# Patient Record
(demographics unavailable — no encounter records)

---

## 2024-11-05 NOTE — REVIEW OF SYSTEMS
[Fatigue] : fatigue [Lower Ext Edema] : lower extremity edema [Diarrhea: Grade 0] : Diarrhea: Grade 0 [Easy Bleeding] : a tendency for easy bleeding [Easy Bruising] : a tendency for easy bruising [Negative] : Musculoskeletal

## 2024-11-07 NOTE — REASON FOR VISIT
[Initial Consultation] : an initial consultation for [Follow-Up Visit] : a follow-up visit for [FreeTextEntry2] : right lower ext DVT and PE

## 2024-11-07 NOTE — ASSESSMENT
[Reviewed updated] : Reviewed updated [Designated Health Care Proxy] : Designated Health Care Proxy [Name: ___] : Name: [unfilled] [Relationship: ___] : Relationship: [unfilled] [DNR] : DNR [Last Verification Date: ___] : Last Verification Date: [unfilled] [FreeTextEntry1] : Right LE DVT/ Saddle PE Provoked by right hip surgery 6/26/24 H/o Left LE DVT after left hip surgery in 2018 s/p eliquis x 6 m Doppler/CT at [facility] 6/26/24: saddle PE with clot extension in all the 5 lung lobes This was done same day as his surgery He was immobile prior to the surgery No Family ho DVT/PE No Family HO miscarriages - mother had 1 miscarriage-unsure which trimester Family Hx of cancer: Father: "spleen cancer" Cancer screening: Colonoscopy was 4 years. PSA annually with PCP (most recently was 13 on 9/5/24, as against 17 few months ago. At one time, PSA was in 40s)  Smoking: Quit 48 years. 1/2 ppd x 15 years Liver transplant 1996 for acute idiopathic fulminant liver failure- prednsione, prograf, cellcept Current Anticoagulation/ Antiplatelet therapy: Xarelto 20 mg QD s/p IVC filter. Advised to remove it within 6 months unless he requires further surgery  Seen today for follow up Factor V Leiden mutation, Prothrombin Gene mutation, APS panel, Protein S, protein C, AT3- negative Has elevated Factor VIII and positive DRVVT (negative DEANGELO, B2G Abs) Explained that given that he has 2 likely provoked VTE events, including the most recent DVT+PE where he had saddle PE, I recommend long term anticoagulation  May consider changing to a prophylactic dose after 12 months if doing well Also advised to be uptodate with age appropriate cancer screening He has consistently elevated albeit declinig PSA. Advised to follow up with urology Fall precautions reiterated  Vitamin B12 def Discussed about SubQ vs Sublingual dosing.  He prefers sublingual dosing Take vitamin B12 5120-8714 mcg sublingual daily forever  IgG Kappa Monoclonal gammopathy M SPike unable to quantitate FLCR nromal Likely MGUS Can be monitored annually  Patient and his wife had multiple questions which were answered to satisfaction  They prefer to monitor his labs (B12, D Dimer, SPEP, IFx, FLCR) with the PCP They will follow up with me PRN [AdvancecareDate] : 11/05/24

## 2024-11-07 NOTE — HISTORY OF PRESENT ILLNESS
[de-identified] : Mr. Narinder Calloway is 77 y/o male with R lower ext DVT and saddle PE here for consultation, referred by Dr. Ed Reynoso Accompanied by his spouse Yuly Calloway  Patient with hx of liver transplant recipient, HLD, lumbar radiculopathy, CAD s/p 4 stents, hernia surgery who had multiple VTEs after hip surgeries. 1st - left hip surgeyr in 2018 - 2 weeks later with left lower leg DVT on popliteal-femoral - on Eliquis x 6 months 2nd - Right hip surgery on 6/26/2024 and right lower leg DVT and saddle PE following immediately after surgery - now on Xarelto 20 mg. He's less mobile due to pain prior to surgery.  FHx: Father with spleen cancer at age 60s Mother with possibly 1 miscarriage  SocialHx: Smoked 1/2 over 30 yrs ago Social Alcohol use Denies any illicit drugs Lives with his spouse Yuly Calloway (HCP)  Screenings: Colonoscopy - 2020   b/l lower ext edema but no chest pain or SOB.  Still on PT for hip surgery   [de-identified] : Pt here for follow up Overall feels well Taking ASA 81mg and xarelto as prescribed

## 2024-11-07 NOTE — PHYSICAL EXAM
[Ambulatory and capable of all self care but unable to carry out any work activities] : Status 2- Ambulatory and capable of all self care but unable to carry out any work activities. Up and about more than 50% of waking hours [Normal] : affect appropriate [de-identified] : swelling on b/l lower ext

## 2024-11-07 NOTE — ASSESSMENT
[Reviewed updated] : Reviewed updated [Designated Health Care Proxy] : Designated Health Care Proxy [Name: ___] : Name: [unfilled] [Relationship: ___] : Relationship: [unfilled] [DNR] : DNR [Last Verification Date: ___] : Last Verification Date: [unfilled] [FreeTextEntry1] : Right LE DVT/ Saddle PE Provoked by right hip surgery 6/26/24 H/o Left LE DVT after left hip surgery in 2018 s/p eliquis x 6 m Doppler/CT at [facility] 6/26/24: saddle PE with clot extension in all the 5 lung lobes This was done same day as his surgery He was immobile prior to the surgery No Family ho DVT/PE No Family HO miscarriages - mother had 1 miscarriage-unsure which trimester Family Hx of cancer: Father: "spleen cancer" Cancer screening: Colonoscopy was 4 years. PSA annually with PCP (most recently was 13 on 9/5/24, as against 17 few months ago. At one time, PSA was in 40s)  Smoking: Quit 48 years. 1/2 ppd x 15 years Liver transplant 1996 for acute idiopathic fulminant liver failure- prednsione, prograf, cellcept Current Anticoagulation/ Antiplatelet therapy: Xarelto 20 mg QD s/p IVC filter. Advised to remove it within 6 months unless he requires further surgery  Seen today for follow up Factor V Leiden mutation, Prothrombin Gene mutation, APS panel, Protein S, protein C, AT3- negative Has elevated Factor VIII and positive DRVVT (negative DEANGELO, B2G Abs) Explained that given that he has 2 likely provoked VTE events, including the most recent DVT+PE where he had saddle PE, I recommend long term anticoagulation  May consider changing to a prophylactic dose after 12 months if doing well Also advised to be uptodate with age appropriate cancer screening He has consistently elevated albeit declinig PSA. Advised to follow up with urology Fall precautions reiterated  Vitamin B12 def Discussed about SubQ vs Sublingual dosing.  He prefers sublingual dosing Take vitamin B12 5298-1842 mcg sublingual daily forever  IgG Kappa Monoclonal gammopathy M SPike unable to quantitate FLCR nromal Likely MGUS Can be monitored annually  Patient and his wife had multiple questions which were answered to satisfaction  They prefer to monitor his labs (B12, D Dimer, SPEP, IFx, FLCR) with the PCP They will follow up with me PRN [AdvancecareDate] : 11/05/24

## 2024-11-07 NOTE — HISTORY OF PRESENT ILLNESS
[de-identified] : Mr. Narinder Calloway is 77 y/o male with R lower ext DVT and saddle PE here for consultation, referred by Dr. Ed Reynoso Accompanied by his spouse Yuly Calloway  Patient with hx of liver transplant recipient, HLD, lumbar radiculopathy, CAD s/p 4 stents, hernia surgery who had multiple VTEs after hip surgeries. 1st - left hip surgeyr in 2018 - 2 weeks later with left lower leg DVT on popliteal-femoral - on Eliquis x 6 months 2nd - Right hip surgery on 6/26/2024 and right lower leg DVT and saddle PE following immediately after surgery - now on Xarelto 20 mg. He's less mobile due to pain prior to surgery.  FHx: Father with spleen cancer at age 60s Mother with possibly 1 miscarriage  SocialHx: Smoked 1/2 over 30 yrs ago Social Alcohol use Denies any illicit drugs Lives with his spouse Yuly Calloway (HCP)  Screenings: Colonoscopy - 2020   b/l lower ext edema but no chest pain or SOB.  Still on PT for hip surgery   [de-identified] : Pt here for follow up Overall feels well Taking ASA 81mg and xarelto as prescribed

## 2024-11-07 NOTE — PHYSICAL EXAM
[Ambulatory and capable of all self care but unable to carry out any work activities] : Status 2- Ambulatory and capable of all self care but unable to carry out any work activities. Up and about more than 50% of waking hours [Normal] : affect appropriate [de-identified] : swelling on b/l lower ext

## 2024-11-07 NOTE — PHYSICAL EXAM
[Ambulatory and capable of all self care but unable to carry out any work activities] : Status 2- Ambulatory and capable of all self care but unable to carry out any work activities. Up and about more than 50% of waking hours [Normal] : affect appropriate [de-identified] : swelling on b/l lower ext

## 2024-11-07 NOTE — ASSESSMENT
[Reviewed updated] : Reviewed updated [Designated Health Care Proxy] : Designated Health Care Proxy [Name: ___] : Name: [unfilled] [Relationship: ___] : Relationship: [unfilled] [DNR] : DNR [Last Verification Date: ___] : Last Verification Date: [unfilled] [FreeTextEntry1] : Right LE DVT/ Saddle PE Provoked by right hip surgery 6/26/24 H/o Left LE DVT after left hip surgery in 2018 s/p eliquis x 6 m Doppler/CT at [facility] 6/26/24: saddle PE with clot extension in all the 5 lung lobes This was done same day as his surgery He was immobile prior to the surgery No Family ho DVT/PE No Family HO miscarriages - mother had 1 miscarriage-unsure which trimester Family Hx of cancer: Father: "spleen cancer" Cancer screening: Colonoscopy was 4 years. PSA annually with PCP (most recently was 13 on 9/5/24, as against 17 few months ago. At one time, PSA was in 40s)  Smoking: Quit 48 years. 1/2 ppd x 15 years Liver transplant 1996 for acute idiopathic fulminant liver failure- prednsione, prograf, cellcept Current Anticoagulation/ Antiplatelet therapy: Xarelto 20 mg QD s/p IVC filter. Advised to remove it within 6 months unless he requires further surgery  Seen today for follow up Factor V Leiden mutation, Prothrombin Gene mutation, APS panel, Protein S, protein C, AT3- negative Has elevated Factor VIII and positive DRVVT (negative DEANGELO, B2G Abs) Explained that given that he has 2 likely provoked VTE events, including the most recent DVT+PE where he had saddle PE, I recommend long term anticoagulation  May consider changing to a prophylactic dose after 12 months if doing well Also advised to be uptodate with age appropriate cancer screening He has consistently elevated albeit declinig PSA. Advised to follow up with urology Fall precautions reiterated  Vitamin B12 def Discussed about SubQ vs Sublingual dosing.  He prefers sublingual dosing Take vitamin B12 2070-4879 mcg sublingual daily forever  IgG Kappa Monoclonal gammopathy M SPike unable to quantitate FLCR nromal Likely MGUS Can be monitored annually  Patient and his wife had multiple questions which were answered to satisfaction  They prefer to monitor his labs (B12, D Dimer, SPEP, IFx, FLCR) with the PCP They will follow up with me PRN [AdvancecareDate] : 11/05/24

## 2024-11-07 NOTE — HISTORY OF PRESENT ILLNESS
[de-identified] : Mr. Narinder Calloway is 79 y/o male with R lower ext DVT and saddle PE here for consultation, referred by Dr. Ed Reynoso Accompanied by his spouse Yuly Calloway  Patient with hx of liver transplant recipient, HLD, lumbar radiculopathy, CAD s/p 4 stents, hernia surgery who had multiple VTEs after hip surgeries. 1st - left hip surgeyr in 2018 - 2 weeks later with left lower leg DVT on popliteal-femoral - on Eliquis x 6 months 2nd - Right hip surgery on 6/26/2024 and right lower leg DVT and saddle PE following immediately after surgery - now on Xarelto 20 mg. He's less mobile due to pain prior to surgery.  FHx: Father with spleen cancer at age 60s Mother with possibly 1 miscarriage  SocialHx: Smoked 1/2 over 30 yrs ago Social Alcohol use Denies any illicit drugs Lives with his spouse Yuly aClloway (HCP)  Screenings: Colonoscopy - 2020   b/l lower ext edema but no chest pain or SOB.  Still on PT for hip surgery   [de-identified] : Pt here for follow up Overall feels well Taking ASA 81mg and xarelto as prescribed Equal and normal pulses (carotid, femoral, dorsalis pedis) detailed exam

## 2024-12-04 NOTE — ASSESSMENT
[FreeTextEntry1] : 78-year-old male with above-mentioned history including liver transplant, CAD, prior LLE DVT, presenting for posthospitalization discharge at [facility] for PE/DVT discovered postoperatively after right hip surgery - here for f/u  PE/DVT  - In conversation with his hematologist who ran a gamut of tests to assess for hypercoagulability, this workup was negative.  Although his 2 episodes of DVT/PE were in the context of surgery, he seems to have a high propensity to develop blood clots despite this workup, and agree with recommendation to continue him on long-term anticoagulation with Eliquis as he is tolerating therapy well.  Of note, he was placed on sublingual B12 for vitamin B12 deficiency. - He did not get PFTs but at this point, he is continuously asymptomatic from a pulmonary standpoint and does not require any additional workup.  Follow-up as needed.  Discussed case with PMD (Ed Reynoso)

## 2024-12-04 NOTE — HISTORY OF PRESENT ILLNESS
[TextBox_4] :  78-year-old male with history of Autoimmune Hepatitis s/p Liver transplant in 1996, CKD, BPH, ASCVD, CAD, history of LLE DVT, GERD, osteoarthritis, and gout who presents for a post-hospitalization discharge for a PE/DVT.  In 6/2024, he had a right hip surgery at [facility].  Immediately postoperatively, he had a DVT study done as he has a history of having a left femoral to popliteal DVT in 2018 post left hip surgery at the time.  Study was positive for right popliteal DVT and further CT angio that was done during that hospitalization showed a saddle pulmonary embolism.  Reportedly echocardiogram was normal and cardiac enzymes were not elevated and initially he was placed on a DVT filter (likely due to being POD 0), and eventually transition to full anticoagulation.  He is currently on Xarelto twice daily.  Throughout the entire course, he had no respiratory symptoms and did not require oxygen at any point reportedly.  He has no pulmonary history and is never smoker.  Currently has no respiratory issues including dyspnea (both at rest and with exertion), wheezing, coughing, chest pain, etc. Also has no constitutional symptoms including fevers, night sweats, unintentional weight loss.   12/4/24 Patient presents for follow-up.  Had his IVC filter removed yesterday by interventional radiology and tolerated procedure very well.  Had no complications and continues to have no respiratory issues.  Has been having some backache related to his orthopedic issues but no complaints otherwise. [ESS] : 0

## 2024-12-11 NOTE — ASSESSMENT
[FreeTextEntry1] : 79 yom w/ multiple medical problems presents w/ severe back pain.  I have personally reviewed the patient's MRI in detail and discussed it with them which is significant for a disc herniation at L5-S1.  Although I do believe that he would benefit from KELECHI, at this time, recommend medical marijuana consultation with Erika Arthur NP, as I do believe that this is a lower risk option for the patient.  Would need to discontinue blood thinners for 72h prior to intervention.  Hip doing much better after surgery.  Advised of risk of long term use of steroids as he has been on steroid for years.  Physical therapy prescribed - goal will be to increase ROM, strengthening, postural training, other modalities ad minnie which may include massage and stim. Goals of therapy discussed with the patient in detail and will be discussed with physical therapist. Patient will follow-up following course of physical therapy to monitor progress and adjust therapy as needed.  Acetaminophen 1,000 mg q8h prn for moderate pain. Risks, benefits, and alternatives of acetaminophen discussed with patient  Diet and nutritional strategies discussed which may improve patients pain and will improve overall health.  I explained to patient benefits and limitation of TeleMedicine visits. Patient understands that limitations include inability to perform comprehensive physical exam, which may lead to potential diagnostic inconsistencies.  Patient understands that diagnosis and treatment may be limited by these inconsistencies and patient agrees to proceed with care plan.

## 2024-12-11 NOTE — PHYSICAL EXAM
[de-identified] : Constitutional: Well-developed, in no acute distress  Psychiatric: Appropriate mood and affect, oriented to time, place, person, and situation

## 2024-12-11 NOTE — REVIEW OF SYSTEMS
[Back Pain] : back pain [Muscle Pain] : muscle pain [Radiating Pain] : radiating pain [Joint Pain] : joint pain [Negative] : Heme/Lymph

## 2024-12-11 NOTE — HISTORY OF PRESENT ILLNESS
[Back Pain] : back pain [___ yrs] : [unfilled] year(s) ago [Constant] : constant [5] : a current pain level of 5/10 [6] : an average pain level of 6/10 [0] : a minimum pain level of 0/10 [8] : a maximum pain level of 8/10 [Sharp] : sharp [Stabbing] : stabbing [Shooting] : shooting [Walking] : walking [Transitioning] : transitioning [Bending] : bending [Medications] : medications [Heat] : heat [Ice] : ice [Other: ___] : [unfilled] [FreeTextEntry1] : Verbal consent was given by/on: Laisha Puente on 12/11/24  Patient location: Home, [place], [place]  Physician location: Office, [place], [place]  Reason for Telehealth visit: Back pain  He had a hip replacement done on 06/26/24. He reports that everything went well, although he did have some BP issues. He was in rehab for five weeks. His back pain is bothering him substantially. Quality of life is impaired. There has been a severe exacerbation of the patient's chronic pain. He remains on Xarelto. He was found to have a DVT as well and PE.  This service took place using a two way audio and visual platform. The patient and Dr. Taylor were both able to see each other and communicate through video. There were no barriers to communication. Greater then 50% of the time spent in the encounter involved counseling and coordination of care.   Time spent on visit: 30 minutes  HPI: Mr. LAISHA PUENTE is a 78 year M on baby ASA and chronic steroids with pmhx of ASHD with LILIANA, Autoimmune hepatitis s/p liver transplant (1996), chronic renal insufficiency, CAD, chronic left lower extremity post op DVT (2018- Dr Cervantes), HTN, LTH Replacement (2018), right inguinal hernia with mesh and sepsis post prostate biopsy. C/o severe right groin and lateral hip pain. Saw Dr Muller who does not think these are lumbar radicular symptoms. Referred for US guided hip injection. He is not interested in any surgical options.  Interventions: L5-S1 interlaminar KELECHI  L5-S1 interlaminar KELECHI (12/11/23): Right hip intraarticular hip (11/13/23): [FreeTextEntry7] : Radiating down right buttock and right hip  [FreeTextEntry3] : stretch

## 2024-12-12 NOTE — ASSESSMENT
[Arterial/Venous Disease] : arterial/venous disease [Medication Management] : medication management [FreeTextEntry1] : 80 y/o M with a second episode of the DVT following hip surgery.  The first was in 2018 and the most recent 1 was on June 26, 2024, affecting R popV along with saddle PE (on Xareto). Heme w/u was completed and longterm anticoag was recommended. 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  Hypercoagulable.  Lupus anticoagulant.  Needs to stay on Eliquis and also ASA low dose for the coronary stents.   FU prn.  Expalined leg exercises to him to promote venous return. Moisturize the feet and lets.  Difficulty walking due to lumbar disc pain.   He made a plan to see Dr Lock or his NP today to discuss pain management.

## 2024-12-12 NOTE — ASSESSMENT
[Arterial/Venous Disease] : arterial/venous disease [Medication Management] : medication management [FreeTextEntry1] : 78 y/o M with a second episode of the DVT following hip surgery.  The first was in 2018 and the most recent 1 was on June 26, 2024, affecting R popV along with saddle PE (on Xareto). Heme w/u was completed and longterm anticoag was recommended. 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  Hypercoagulable.  Lupus anticoagulant.  Needs to stay on Eliquis and also ASA low dose for the coronary stents.   FU prn.  Expalined leg exercises to him to promote venous return. Moisturize the feet and lets.  Difficulty walking due to lumbar disc pain.   He made a plan to see Dr Lock or his NP today to discuss pain management.

## 2024-12-12 NOTE — PHYSICAL EXAM
[Normal Breath Sounds] : Normal breath sounds [Respiratory Effort] : normal respiratory effort [2+] : left 2+ [Ankle Swelling Bilaterally] : bilaterally  [Ankle Swelling On The Left] : moderate [No Rash or Lesion] : No rash or lesion [Alert] : alert [Oriented to Person] : oriented to person [Oriented to Place] : oriented to place [Oriented to Time] : oriented to time [Calm] : calm [JVD] : no jugular venous distention  [de-identified] : Well appearing, NAD. Pleasen and articulate. Wife is with him.  [de-identified] : Clear [de-identified] : Clear [FreeTextEntry1] : Feet are warm and pink with good capillary refill in the toes.  Edema is present in both legs from the feet up the knees.  Skin is no as dry as in the past.   [de-identified] : FROM

## 2024-12-12 NOTE — PHYSICAL EXAM
[Normal Breath Sounds] : Normal breath sounds [Respiratory Effort] : normal respiratory effort [2+] : left 2+ [Ankle Swelling On The Left] : moderate [Ankle Swelling Bilaterally] : bilaterally  [No Rash or Lesion] : No rash or lesion [Alert] : alert [Oriented to Person] : oriented to person [Oriented to Place] : oriented to place [Oriented to Time] : oriented to time [Calm] : calm [JVD] : no jugular venous distention  [de-identified] : Well appearing, NAD. Pleasen and articulate. Wife is with him.  [de-identified] : Clear [de-identified] : Clear [FreeTextEntry1] : Feet are warm and pink with good capillary refill in the toes.  Edema is present in both legs from the feet up the knees.  Skin is no as dry as in the past.   [de-identified] : FROM

## 2024-12-12 NOTE — HISTORY OF PRESENT ILLNESS
[FreeTextEntry1] : 80 y/o M last seen Dec 2022 by LARS Ly and 2019 by Dr Cervantes. He has a PMHx of liver transplant (1996) (Dr Quincy Luis) 2/2 autoimmune hepatitis, CAD s/p 4 stents (Dr Fortunato Zuniga is Cardiologist), s/p left hip replacement (10/1/2018), and on 12/19/18 was found to have a long segment left femoral DVT, off Eliquis since early June 2019.  The DVT and 2018 was following the left hip replacement.  Swollen leg and diagnosed sometime after onset of swelling.  In June he presented with a new right popliteal vein DVT and saddle PE, again following hip replacement on the right side on June 26, 2024. IVC filter was placed at the time. The surgery was also complicated by new onset of orthostatic hypotension which was primarily responsible for why he stayed in the hospital for 2 weeks.  He recovered and rehab for 5 weeks.  He was placed on midodrine and Xarelto, which he was advised to continue. Conservative measures were also recommended for bilt leg edema, daily skin care and nail care.   Heme eval was completed by Dr Bob and longterm anticoag was recommended. 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 Additionally, the IVC filter was removed at [facility] 12/3/24 by IR, successfully.  Today, he reports things are stable. Bilateral leg edema.  Wesars OTC stockings.  Can not tolerate the compressions grade stockings.  Asked about a new venous duplex and I explained there is not reason to get one since we would not change what we do.

## 2024-12-12 NOTE — ADDENDUM
[FreeTextEntry1] : I, Dr. Pipo Cervantes, personally performed the evaluation and management (E/M) services for this established patient who presents today with (a) new problem(s)/exacerbation of (an) existing condition(s).  That E/M includes conducting the examination, assessing all new/exacerbated conditions, and establishing a new plan of care.  Today, my ACP, Ladi Ly NP, was here to observe my evaluation and management services for this new problem/exacerbated condition to be followed going forward.

## 2024-12-12 NOTE — HISTORY OF PRESENT ILLNESS
[FreeTextEntry1] : 80 y/o M last seen Dec 2022 by LARS Ly and 2019 by Dr Cervantes. He has a PMHx of liver transplant (1996) (Dr Quincy Luis) 2/2 autoimmune hepatitis, CAD s/p 4 stents (Dr Fortunato Zuniga is Cardiologist), s/p left hip replacement (10/1/2018), and on 12/19/18 was found to have a long segment left femoral DVT, off Eliquis since early June 2019.  The DVT and 2018 was following the left hip replacement.  Swollen leg and diagnosed sometime after onset of swelling.  In June he presented with a new right popliteal vein DVT and saddle PE, again following hip replacement on the right side on June 26, 2024. IVC filter was placed at the time. The surgery was also complicated by new onset of orthostatic hypotension which was primarily responsible for why he stayed in the hospital for 2 weeks.  He recovered and rehab for 5 weeks.  He was placed on midodrine and Xarelto, which he was advised to continue. Conservative measures were also recommended for bilt leg edema, daily skin care and nail care.   Heme eval was completed by Dr Bob and longterm anticoag was recommended. 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 Additionally, the IVC filter was removed at [facility] 12/3/24 by IR, successfully.  Today, he reports things are stable. Bilateral leg edema.  Wesars OTC stockings.  Can not tolerate the compressions grade stockings.  Asked about a new venous duplex and I explained there is not reason to get one since we would not change what we do.

## 2024-12-13 NOTE — ASSESSMENT
[FreeTextEntry1] : 79 yom w/ multiple medical problems presents w/ severe back pain.  MRI Lumbar Spine: Right disc herniation at L5--S1.   Patient evaluated for medical cannabis. Reviewed medical history and current medications. Without significant contraindications including severe psychiatric illness or severe cardiovascular illness. Hx liver transplant. Potential interactions with Tacrolimus levels. Advised to consult with his transplant Dr Brady Luis prior to initiation. In addition will discuss with his Cardiologist Dr Jose next week. No hx heart failure, Topicals reasonable at this time and may start orals after consultation with his physicians. Treatment team feels that patient meets criteria for medical cannabis certification including Chronic Pain that degrades functional capabilities per consultation with pharmacist at the dispensary. Reviewed potential for adverse events and stressed not to drive, make important life-changing decisions, operate heavy machinery during treatment or mix with sedation medications Explained patient will need to report adverse events to provider. Encouraged patient to ask additional questions. Advised patient can also direct questions to personnel and pharmacist at the dispensary. Discussed certification process. All questions answered.       Certification process performed on [facility]  Paperwork provided via mail and email with patients consent:: [facility] Website to find dispensary, agreement for use of Controlled substance medication(s) (reviewed verbally in consultation), guidelines, instructions to add a caregiver.    I-stop reference number #: [id number]   Acetaminophen 1,000 mg q8h prn for moderate pain. Risks, benefits, and alternatives of acetaminophen discussed with patient  Physical therapy prescribed - goal will be to increase ROM, strengthening, postural training, other modalities ad minnie which may include massage and stim. Goals of therapy discussed with the patient in detail and will be discussed with physical therapist. Patient will follow-up following course of physical therapy to monitor progress and adjust therapy as needed.  If no improvement f/u Dr Taylor for intervention  I explained to patient benefits and limitation of TeleMedicine visits. Patient understands that limitations include inability to perform comprehensive physical exam, which may lead to potential diagnostic inconsistencies.  Patient understands that diagnosis and treatment may be limited by these inconsistencies and patient agrees to proceed with care plan.

## 2024-12-13 NOTE — HISTORY OF PRESENT ILLNESS
[Back Pain] : back pain [___ yrs] : [unfilled] year(s) ago [Constant] : constant [5] : a current pain level of 5/10 [6] : an average pain level of 6/10 [0] : a minimum pain level of 0/10 [8] : a maximum pain level of 8/10 [Sharp] : sharp [Stabbing] : stabbing [Shooting] : shooting [Walking] : walking [Transitioning] : transitioning [Bending] : bending [Medications] : medications [Heat] : heat [Ice] : ice [Other: ___] : [unfilled] [Home] : at home, [unfilled] , at the time of the visit. [Medical Office: ([facility])___] : at the medical office located in  [Verbal consent obtained from patient] : the patient, [unfilled] [FreeTextEntry1] :  Reason for Telehealth visit: Back pain  This service took place using a two way audio and visual platform. The patient and Erika Arthur NP were both able to see each other and communicate through video. There were no barriers to communication. Greater then 50% of the time spent in the encounter involved counseling and coordination of care.  Time spent on visit: 30 minutes  Interval hx S/p hip replacement (06/26/24) post op course c/b right popliteal vein DVT, saddle PE, and orthostatic hypotension.  IVC filter placement. Stayed in the hospital for 2 weeks. He recovered and rehab for 5 weeks. During this time had and exacerbation of his chronic back pain. Pain persists. IVC removed 12/3/24. He remains on Xarelto. Wishes to trial medical cannabis for his pain prior to intervention in consideration of risks with holding his AC. Denies any personal/FH of depression, schizophrenia, SI.  Denies any additional weakness, numbness, bowel/bladder dysfunction, history of bleeding disorders.   HPI: Mr. LAISHA PUENTE is a 78 year M on baby ASA and chronic steroids with pmhx of ASHD with LILIANA, Autoimmune hepatitis s/p liver transplant (1996), chronic renal insufficiency, CAD, chronic left lower extremity post op DVT (2018- Dr Cervantes), HTN, LTH Replacement (2018), right inguinal hernia with mesh and sepsis post prostate biopsy. C/o severe right groin and lateral hip pain. Saw Dr Muller who does not think these are lumbar radicular symptoms. Referred for US guided hip injection. He is not interested in any surgical options.  Interventions: L5-S1 interlaminar KELECHI  L5-S1 interlaminar KELECHI (12/11/23): Right hip intraarticular hip (11/13/23): [FreeTextEntry7] : Radiating down right buttock and right hip  [FreeTextEntry3] : stretch

## 2024-12-13 NOTE — PHYSICAL EXAM
[de-identified] : Constitutional: Well-developed, in no acute distress  Psychiatric: Appropriate mood and affect, oriented to time, place, person, and situation

## 2024-12-18 NOTE — ASSESSMENT
[FreeTextEntry1] : 80 y/o M with a second episode of the DVT following hip surgery.  The first was in 2018 and the most recent 1 was on June 26, 2024, affecting R popV along with saddle PE (on Xareto). Heme w/u was completed and longterm anticoag was recommended. 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  Hypercoagulable.  Lupus anticoagulant.  Needs to stay on Eliquis and also ASA low dose for the coronary stents.   FU prn.  Expalined leg exercises to him to promote venous return. Moisturize the feet and lets.  Difficulty walking due to lumbar disc pain.   He made a plan to see Dr Lock or his NP today to discuss pain management.

## 2024-12-18 NOTE — PHYSICAL EXAM
[JVD] : no jugular venous distention  [de-identified] : Well appearing, NAD. Pleasen and articulate. Wife is with him.  [de-identified] : Clear [de-identified] : Clear [FreeTextEntry1] : Feet are warm and pink with good capillary refill in the toes.  Edema is present in both legs from the feet up the knees.  Skin is no as dry as in the past.   [de-identified] : FROM

## 2024-12-18 NOTE — HISTORY OF PRESENT ILLNESS
[FreeTextEntry1] : 78 y/o M last seen Dec 2022 by LARS Ly and 2019 by Dr Cervantes. He has a PMHx of liver transplant (1996) (Dr Quincy Luis) 2/2 autoimmune hepatitis, CAD s/p 4 stents (Dr Fortunato Zuniga is Cardiologist), s/p left hip replacement (10/1/2018), and on 12/19/18 was found to have a long segment left femoral DVT, off Eliquis since early June 2019.  The DVT and 2018 was following the left hip replacement.  Swollen leg and diagnosed sometime after onset of swelling.  In June he presented with a new right popliteal vein DVT and saddle PE, again following hip replacement on the right side on June 26, 2024. IVC filter was placed at the time. The surgery was also complicated by new onset of orthostatic hypotension which was primarily responsible for why he stayed in the hospital for 2 weeks.  He recovered and rehab for 5 weeks.  He was placed on midodrine and Xarelto, which he was advised to continue. Conservative measures were also recommended for bilt leg edema, daily skin care and nail care.   Heme eval was completed by Dr Bob and longterm anticoag was recommended. 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 Additionally, the IVC filter was removed at [facility] 12/3/24 by IR, successfully.  Today, he reports things are stable. Bilateral leg edema.  Wesars OTC stockings.  Can not tolerate the compressions grade stockings.  Asked about a new venous duplex and I explained there is not reason to get one since we would not change what we do.

## 2024-12-18 NOTE — HISTORY OF PRESENT ILLNESS
[FreeTextEntry1] : 80 y/o M last seen Dec 2022 by LARS Ly and 2019 by Dr Cervantes. He has a PMHx of liver transplant (1996) (Dr Quincy Luis) 2/2 autoimmune hepatitis, CAD s/p 4 stents (Dr Fortunato Zuniga is Cardiologist), s/p left hip replacement (10/1/2018), and on 12/19/18 was found to have a long segment left femoral DVT, off Eliquis since early June 2019.  The DVT and 2018 was following the left hip replacement.  Swollen leg and diagnosed sometime after onset of swelling.  In June he presented with a new right popliteal vein DVT and saddle PE, again following hip replacement on the right side on June 26, 2024. IVC filter was placed at the time. The surgery was also complicated by new onset of orthostatic hypotension which was primarily responsible for why he stayed in the hospital for 2 weeks.  He recovered and rehab for 5 weeks.  He was placed on midodrine and Xarelto, which he was advised to continue. Conservative measures were also recommended for bilt leg edema, daily skin care and nail care.   Heme eval was completed by Dr Bob and longterm anticoag was recommended. 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 Additionally, the IVC filter was removed at [facility] 12/3/24 by IR, successfully.  Today, he reports things are stable. Bilateral leg edema.  Wesars OTC stockings.  Can not tolerate the compressions grade stockings.  Asked about a new venous duplex and I explained there is not reason to get one since we would not change what we do.

## 2024-12-18 NOTE — PHYSICAL EXAM
[JVD] : no jugular venous distention  [de-identified] : Clear [de-identified] : Well appearing, NAD. Pleasen and articulate. Wife is with him.  [de-identified] : Clear [FreeTextEntry1] : Feet are warm and pink with good capillary refill in the toes.  Edema is present in both legs from the feet up the knees.  Skin is no as dry as in the past.   [de-identified] : FROM

## 2024-12-18 NOTE — ASSESSMENT
[FreeTextEntry1] : 78 y/o M with a second episode of the DVT following hip surgery.  The first was in 2018 and the most recent 1 was on June 26, 2024, affecting R popV along with saddle PE (on Xareto). Heme w/u was completed and longterm anticoag was recommended. 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  Hypercoagulable.  Lupus anticoagulant.  Needs to stay on Eliquis and also ASA low dose for the coronary stents.   FU prn.  Expalined leg exercises to him to promote venous return. Moisturize the feet and lets.  Difficulty walking due to lumbar disc pain.   He made a plan to see Dr Lock or his NP today to discuss pain management.

## 2024-12-26 NOTE — DISCUSSION/SUMMARY
[FreeTextEntry1] : Orthostatic hypotension The working diagnosis is autonomic dysfunction with resultant severe symptomatic orthostatic hypotension.  Mild intravascular volume depletion may  have been  contributing to the patient's symptoms.  At this time blood pressure levels have been normal to somewhat elevated without any requirements for the administration of midodrine  I have recommended the following Maintain adequate hydration Continue the present medical regimen No antihypertensive agents at this time   .   Atherosclerotic heart disease Mr. Calloway has known atherosclerotic heart disease. There is no history of a myocardial infarction or congestive heart failure. In 4/09 he required an LAD stent for a proximal stenosis and angina. In 2/14 two additional LAD stents were required. The last coronary angiogram was performed at [facility] in 1/16. A 70% proximal OM 2 stenosis was addressed by PCTA/LILIANA. The 3 LAD stents were patent. Mild proximal and distal lesions were noted. The left circumflex demonstrated mild luminal irregularities. The RCA was small and nondominant.  Since that time there has been no  cardiac event.  A 3/21 Lexiscan  sestamibi study demonstrated normal perfusion.  The resting  7/24      electrocardiogram  reveals  no evidence of myocardial ischemia or infarction.  Left ventricular systolic function is normal as reflected by a left ventricle ejection fraction of 55% on the 3/21 gated sestamibi study and 65% on a  3/21 echocardiogram In view of the stable symptoms and above noninvasive studies continued medical management is indicated at this time.   I have recommended the following: a. Risk factor modification b. Continue  the  present medical regimen c. No further cardiac testing for this problem at this time d .Electrocardiogram with next office evaluation  2025 e. Echocardiogram  with next office evaluation 2025     Hypertension Most recent blood pressure levels have been normal to slightly elevated.       Previous treatment with antihypertensive agents  (lisinopril) have discontinued in the recent past due to  renal insufficiency. and orthostatic hypotension.   Chronic renal insufficiency is a major influence on management.  . Nonpharmacological therapy, specifically diet exercise and weight loss are emphasized as major aspects of treatment.  I have recommended the following a. Low-fat low-cholesterol diet. Regular aerobic exercise  to the extent possible and weight loss b see  orthostatic hypotension entry above c.  Home blood pressure monitoring       Hyperlipidemia Hyperlipidemia represents a risk factor for progressive atherosclerotic heart disease. The target LDL level with known atherosclerotic heart disease is about 70. HMG-CoA reductase  inhibitor therapy had been effective. In 4/23 the serum cholesterol level was 127 triglycerides 149 HDL 47 and LDL 50 . Previous treatment with Vytorin had been discontinued for uncertain reasons  In 9/24 the serum cholesterol level was 264 triglycerides 242 HDL 60 and   Vytorin 10/20mg was then reinstituted. Nonpharmacological therapy, specifically diet exercise and weight loss are emphasized as  major aspects of treatment.  I have recommended the following: a low--fat low-cholesterol diet. Regular aerobic exercise and weight loss b.  Target LDL level to about 70 as discussed above. c.. Routine laboratory studies including lipid profile through primary care Dr. Reynoso     Obesity Obesity exacerbates Narinder's  cardiovascular issues. Today Mr. Calloway is 5 feet 9 inches tall and weighs 194  pounds.   He has  gained  22  pounds in the last  6  months.  Diet exercise (to the extent possible)  and weight loss are advised.      The diagnosis, prognosis, risks, options and alternatives were explained at length to the patient and family. All questions were answered. Issues discussed included orthostatic hypotension atherosclerotic heart disease hypertension anti-hypertensive agents   peripheral edema  renal insufficiency hyperlipidemia noninvasive cardiac testing diet chest pain/angina and exercise.   Counseling and/or  coordination of care Time was a significant factor  for this patient encounter. Total time spent with the patient was 30 minutes. Greater than 50% of time was devoted to counseling and/or coordination of care

## 2024-12-26 NOTE — REVIEW OF SYSTEMS
[Feeling Fatigued] : feeling fatigued [Lower Ext Edema] : lower extremity edema [Joint Pain] : joint pain [Negative] : Heme/Lymph [FreeTextEntry3] : glasses [FreeTextEntry5] : See history of present illness

## 2024-12-26 NOTE — PHYSICAL EXAM
[Normal Conjunctiva] : the conjunctiva exhibited no abnormalities [Auscultation Breath Sounds / Voice Sounds] : lungs were clear to auscultation bilaterally [Heart Rate And Rhythm] : heart rate and rhythm were normal [Heart Sounds] : normal S1 and S2 [Bowel Sounds] : normal bowel sounds [Abdomen Soft] : soft [Abdomen Tenderness] : non-tender [Abnormal Walk] : normal gait [Affect] : the affect was normal [FreeTextEntry1] : pleasant

## 2024-12-26 NOTE — DISCUSSION/SUMMARY
[FreeTextEntry1] : Orthostatic hypotension The working diagnosis is autonomic dysfunction with resultant severe symptomatic orthostatic hypotension.  Mild intravascular volume depletion may  have been  contributing to the patient's symptoms.  At this time blood pressure levels have been normal to somewhat elevated without any requirements for the administration of midodrine  I have recommended the following Maintain adequate hydration Continue the present medical regimen No antihypertensive agents at this time   .   Atherosclerotic heart disease Mr. Calloway has known atherosclerotic heart disease. There is no history of a myocardial infarction or congestive heart failure. In 4/09 he required an LAD stent for a proximal stenosis and angina. In 2/14 two additional LAD stents were required. The last coronary angiogram was performed at [facility] in 1/16. A 70% proximal OM 2 stenosis was addressed by PCTA/LILIANA. The 3 LAD stents were patent. Mild proximal and distal lesions were noted. The left circumflex demonstrated mild luminal irregularities. The RCA was small and nondominant.  Since that time there has been no  cardiac event.  A 3/21 Lexiscan  sestamibi study demonstrated normal perfusion.  The resting  7/24      electrocardiogram  reveals  no evidence of myocardial ischemia or infarction.  Left ventricular systolic function is normal as reflected by a left ventricle ejection fraction of 55% on the 3/21 gated sestamibi study and 65% on a  3/21 echocardiogram In view of the stable symptoms and above noninvasive studies continued medical management is indicated at this time.   I have recommended the following: a. Risk factor modification b. Continue  the  present medical regimen c. No further cardiac testing for this problem at this time d .Electrocardiogram with next office evaluation  2025 e. Echocardiogram  with next office evaluation 2025     Hypertension Most recent blood pressure levels have been normal to slightly elevated.       Previous treatment with antihypertensive agents  (lisinopril) have discontinued in the recent past due to  renal insufficiency. and orthostatic hypotension.   Chronic renal insufficiency is a major influence on management.  . Nonpharmacological therapy, specifically diet exercise and weight loss are emphasized as major aspects of treatment.  I have recommended the following a. Low-fat low-cholesterol diet. Regular aerobic exercise  to the extent possible and weight loss b see  orthostatic hypotension entry above c.  Home blood pressure monitoring       Hyperlipidemia Hyperlipidemia represents a risk factor for progressive atherosclerotic heart disease. The target LDL level with known atherosclerotic heart disease is about 70. HMG-CoA reductase  inhibitor therapy had been effective. In 4/23 the serum cholesterol level was 127 triglycerides 149 HDL 47 and LDL 50 . Previous treatment with Vytorin had been discontinued for uncertain reasons  In 9/24 the serum cholesterol level was 264 triglycerides 242 HDL 60 and   Vytorin 10/20mg was then reinstituted. Nonpharmacological therapy, specifically diet exercise and weight loss are emphasized as  major aspects of treatment.  I have recommended the following: a low--fat low-cholesterol diet. Regular aerobic exercise and weight loss b.  Target LDL level to about 70 as discussed above. c.. Routine laboratory studies including lipid profile through primary care Dr. Reynoso     Obesity Obesity exacerbates Narinder's  cardiovascular issues. Today Mr. Calloway is 5 feet 9 inches tall and weighs 194  pounds.   He has  gained  22  pounds in the last  6  months.  Diet exercise (to the extent possible)  and weight loss are advised.      The diagnosis, prognosis, risks, options and alternatives were explained at length to the patient and family. All questions were answered. Issues discussed included orthostatic hypotension atherosclerotic heart disease hypertension anti-hypertensive agents   peripheral edema  renal insufficiency hyperlipidemia noninvasive cardiac testing diet chest pain/angina and exercise.   Counseling and/or  coordination of care Time was a significant factor  for this patient encounter. Total time spent with the patient was 30 minutes. Greater than 50% of time was devoted to counseling and/or coordination of care

## 2024-12-26 NOTE — HISTORY OF PRESENT ILLNESS
[FreeTextEntry1] : 79-year-old man Routine follow-up for atherosclerotic heart disease hypertension hyperlipidemia autonomic orthostatic hypotension.  In general "I feel better."  Narinder is now able to ambulate without a walker.  Main complaint is that of chronic back pain.  Medical marijuana treatment is being considered.  No chest pain.  No shortness of breath at rest.  No syncope.  No bleeding complications while taking Xarelto. Blood pressure levels have been normal to elevated without any requirements for the administration of midodrine  Mr. Calloway is accompanied today by his wife